# Patient Record
Sex: MALE | Race: WHITE | Employment: OTHER | URBAN - METROPOLITAN AREA
[De-identification: names, ages, dates, MRNs, and addresses within clinical notes are randomized per-mention and may not be internally consistent; named-entity substitution may affect disease eponyms.]

---

## 2017-02-21 ENCOUNTER — APPOINTMENT (OUTPATIENT)
Dept: CT IMAGING | Facility: HOSPITAL | Age: 52
End: 2017-02-21
Attending: EMERGENCY MEDICINE
Payer: COMMERCIAL

## 2017-02-21 ENCOUNTER — APPOINTMENT (OUTPATIENT)
Dept: GENERAL RADIOLOGY | Facility: HOSPITAL | Age: 52
End: 2017-02-21
Payer: COMMERCIAL

## 2017-02-21 ENCOUNTER — HOSPITAL ENCOUNTER (OUTPATIENT)
Facility: HOSPITAL | Age: 52
Setting detail: OBSERVATION
Discharge: HOME | End: 2017-02-22
Admitting: INTERNAL MEDICINE
Payer: COMMERCIAL

## 2017-02-21 DIAGNOSIS — R07.9 ACUTE CHEST PAIN: Primary | ICD-10-CM

## 2017-02-21 PROBLEM — R73.9 HYPERGLYCEMIA: Status: ACTIVE | Noted: 2017-02-21

## 2017-02-21 LAB
ALBUMIN SERPL BCP-MCNC: 4.5 G/DL (ref 3.5–4.8)
ALP SERPL-CCNC: 34 U/L (ref 32–100)
ALT SERPL-CCNC: 19 U/L (ref 17–63)
ANION GAP SERPL CALC-SCNC: 10 MMOL/L (ref 0–18)
AST SERPL-CCNC: 24 U/L (ref 15–41)
BASOPHILS # BLD: 0 K/UL (ref 0–0.2)
BASOPHILS NFR BLD: 0 %
BILIRUB DIRECT SERPL-MCNC: 0.2 MG/DL (ref 0–0.2)
BILIRUB SERPL-MCNC: 1.2 MG/DL (ref 0.3–1.2)
BUN SERPL-MCNC: 13 MG/DL (ref 8–20)
BUN/CREAT SERPL: 12.7 (ref 10–20)
CALCIUM SERPL-MCNC: 9.9 MG/DL (ref 8.5–10.5)
CHLORIDE SERPL-SCNC: 104 MMOL/L (ref 95–110)
CO2 SERPL-SCNC: 28 MMOL/L (ref 22–32)
CREAT SERPL-MCNC: 1.02 MG/DL (ref 0.5–1.5)
EOSINOPHIL # BLD: 0 K/UL (ref 0–0.7)
EOSINOPHIL NFR BLD: 1 %
ERYTHROCYTE [DISTWIDTH] IN BLOOD BY AUTOMATED COUNT: 13.2 % (ref 11–15)
GLUCOSE SERPL-MCNC: 120 MG/DL (ref 70–99)
HCT VFR BLD AUTO: 46.6 % (ref 41–52)
HGB BLD-MCNC: 15.9 G/DL (ref 13.5–17.5)
LIPASE SERPL-CCNC: 32 U/L (ref 22–51)
LYMPHOCYTES # BLD: 1.2 K/UL (ref 1–4)
LYMPHOCYTES NFR BLD: 24 %
MCH RBC QN AUTO: 30.8 PG (ref 27–32)
MCHC RBC AUTO-ENTMCNC: 34.1 G/DL (ref 32–37)
MCV RBC AUTO: 90.2 FL (ref 80–100)
MONOCYTES # BLD: 0.3 K/UL (ref 0–1)
MONOCYTES NFR BLD: 6 %
NEUTROPHILS # BLD AUTO: 3.4 K/UL (ref 1.8–7.7)
NEUTROPHILS NFR BLD: 69 %
OSMOLALITY UR CALC.SUM OF ELEC: 295 MOSM/KG (ref 275–295)
PLATELET # BLD AUTO: 189 K/UL (ref 140–400)
PMV BLD AUTO: 7.8 FL (ref 7.4–10.3)
POTASSIUM SERPL-SCNC: 4 MMOL/L (ref 3.3–5.1)
PROT SERPL-MCNC: 7.5 G/DL (ref 5.9–8.4)
RBC # BLD AUTO: 5.17 M/UL (ref 4.5–5.9)
SODIUM SERPL-SCNC: 142 MMOL/L (ref 136–144)
TROPONIN I SERPL-MCNC: 0.01 NG/ML (ref ?–0.03)
TROPONIN I SERPL-MCNC: 0.01 NG/ML (ref ?–0.03)
WBC # BLD AUTO: 4.9 K/UL (ref 4–11)

## 2017-02-21 PROCEDURE — 71275 CT ANGIOGRAPHY CHEST: CPT

## 2017-02-21 PROCEDURE — 74175 CTA ABDOMEN W/CONTRAST: CPT

## 2017-02-21 PROCEDURE — 84484 ASSAY OF TROPONIN QUANT: CPT | Performed by: EMERGENCY MEDICINE

## 2017-02-21 PROCEDURE — 93010 ELECTROCARDIOGRAM REPORT: CPT

## 2017-02-21 PROCEDURE — 80048 BASIC METABOLIC PNL TOTAL CA: CPT

## 2017-02-21 PROCEDURE — 85025 COMPLETE CBC W/AUTO DIFF WBC: CPT

## 2017-02-21 PROCEDURE — 99285 EMERGENCY DEPT VISIT HI MDM: CPT

## 2017-02-21 PROCEDURE — 36415 COLL VENOUS BLD VENIPUNCTURE: CPT

## 2017-02-21 PROCEDURE — 84484 ASSAY OF TROPONIN QUANT: CPT

## 2017-02-21 PROCEDURE — 93005 ELECTROCARDIOGRAM TRACING: CPT

## 2017-02-21 PROCEDURE — 71010 XR CHEST AP PORTABLE  (CPT=71010): CPT

## 2017-02-21 PROCEDURE — 80076 HEPATIC FUNCTION PANEL: CPT | Performed by: EMERGENCY MEDICINE

## 2017-02-21 PROCEDURE — 93010 ELECTROCARDIOGRAM REPORT: CPT | Performed by: EMERGENCY MEDICINE

## 2017-02-21 PROCEDURE — 83690 ASSAY OF LIPASE: CPT | Performed by: EMERGENCY MEDICINE

## 2017-02-21 RX ORDER — MAGNESIUM HYDROXIDE/ALUMINUM HYDROXICE/SIMETHICONE 120; 1200; 1200 MG/30ML; MG/30ML; MG/30ML
30 SUSPENSION ORAL ONCE
Status: COMPLETED | OUTPATIENT
Start: 2017-02-21 | End: 2017-02-21

## 2017-02-21 RX ORDER — ASPIRIN 81 MG/1
324 TABLET, CHEWABLE ORAL ONCE
Status: COMPLETED | OUTPATIENT
Start: 2017-02-21 | End: 2017-02-21

## 2017-02-21 NOTE — ED INITIAL ASSESSMENT (HPI)
Pt presents to the er with c/o chest pain and dizziness for 2 days.  Pt reports mid sternal chest pain that radiates up to his throat with sob  Pt also reports pain from back of his head to his shoulders  Pt speaking in complete sentences, equal rise and fa

## 2017-02-22 ENCOUNTER — APPOINTMENT (OUTPATIENT)
Dept: NUCLEAR MEDICINE | Facility: HOSPITAL | Age: 52
End: 2017-02-22
Attending: INTERNAL MEDICINE
Payer: COMMERCIAL

## 2017-02-22 ENCOUNTER — PRIOR ORIGINAL RECORDS (OUTPATIENT)
Dept: OTHER | Age: 52
End: 2017-02-22

## 2017-02-22 ENCOUNTER — APPOINTMENT (OUTPATIENT)
Dept: CV DIAGNOSTICS | Facility: HOSPITAL | Age: 52
End: 2017-02-22
Attending: INTERNAL MEDICINE
Payer: COMMERCIAL

## 2017-02-22 ENCOUNTER — APPOINTMENT (OUTPATIENT)
Dept: INTERVENTIONAL RADIOLOGY/VASCULAR | Facility: HOSPITAL | Age: 52
End: 2017-02-22
Attending: NURSE PRACTITIONER
Payer: COMMERCIAL

## 2017-02-22 VITALS
HEIGHT: 72 IN | SYSTOLIC BLOOD PRESSURE: 116 MMHG | OXYGEN SATURATION: 99 % | WEIGHT: 200 LBS | TEMPERATURE: 98 F | DIASTOLIC BLOOD PRESSURE: 75 MMHG | HEART RATE: 66 BPM | RESPIRATION RATE: 16 BRPM | BODY MASS INDEX: 27.09 KG/M2

## 2017-02-22 LAB
ALBUMIN SERPL BCP-MCNC: 4.2 G/DL (ref 3.5–4.8)
ALBUMIN/GLOB SERPL: 1.6 {RATIO} (ref 1–2)
ALP SERPL-CCNC: 31 U/L (ref 32–100)
ALT SERPL-CCNC: 17 U/L (ref 17–63)
ANION GAP SERPL CALC-SCNC: 10 MMOL/L (ref 0–18)
APTT PPP: 28.1 SECONDS (ref 23.2–35.3)
AST SERPL-CCNC: 21 U/L (ref 15–41)
BILIRUB SERPL-MCNC: 1.1 MG/DL (ref 0.3–1.2)
BUN SERPL-MCNC: 11 MG/DL (ref 8–20)
BUN/CREAT SERPL: 11.8 (ref 10–20)
CALCIUM SERPL-MCNC: 9.4 MG/DL (ref 8.5–10.5)
CHLORIDE SERPL-SCNC: 104 MMOL/L (ref 95–110)
CHOLEST SERPL-MCNC: 167 MG/DL (ref 110–200)
CO2 SERPL-SCNC: 26 MMOL/L (ref 22–32)
CREAT SERPL-MCNC: 0.93 MG/DL (ref 0.5–1.5)
GLOBULIN PLAS-MCNC: 2.6 G/DL (ref 2.5–3.7)
GLUCOSE SERPL-MCNC: 97 MG/DL (ref 70–99)
HDLC SERPL-MCNC: 44 MG/DL
INR BLD: 1 (ref 0.9–1.2)
LDLC SERPL CALC-MCNC: 101 MG/DL (ref 0–99)
NONHDLC SERPL-MCNC: 123 MG/DL
OSMOLALITY UR CALC.SUM OF ELEC: 289 MOSM/KG (ref 275–295)
POTASSIUM SERPL-SCNC: 3.7 MMOL/L (ref 3.3–5.1)
PROT SERPL-MCNC: 6.8 G/DL (ref 5.9–8.4)
PROTHROMBIN TIME: 13.1 SECONDS (ref 11.8–14.5)
SODIUM SERPL-SCNC: 140 MMOL/L (ref 136–144)
TRIGL SERPL-MCNC: 110 MG/DL (ref 1–149)

## 2017-02-22 PROCEDURE — 93018 CV STRESS TEST I&R ONLY: CPT | Performed by: INTERNAL MEDICINE

## 2017-02-22 PROCEDURE — 78452 HT MUSCLE IMAGE SPECT MULT: CPT

## 2017-02-22 PROCEDURE — B2111ZZ FLUOROSCOPY OF MULTIPLE CORONARY ARTERIES USING LOW OSMOLAR CONTRAST: ICD-10-PCS | Performed by: INTERNAL MEDICINE

## 2017-02-22 PROCEDURE — 93458 L HRT ARTERY/VENTRICLE ANGIO: CPT

## 2017-02-22 PROCEDURE — 93017 CV STRESS TEST TRACING ONLY: CPT

## 2017-02-22 PROCEDURE — 93018 CV STRESS TEST I&R ONLY: CPT | Performed by: NUCLEAR MEDICINE

## 2017-02-22 PROCEDURE — 80061 LIPID PANEL: CPT | Performed by: INTERNAL MEDICINE

## 2017-02-22 PROCEDURE — 85610 PROTHROMBIN TIME: CPT | Performed by: NURSE PRACTITIONER

## 2017-02-22 PROCEDURE — 93016 CV STRESS TEST SUPVJ ONLY: CPT | Performed by: INTERNAL MEDICINE

## 2017-02-22 PROCEDURE — 4A023N7 MEASUREMENT OF CARDIAC SAMPLING AND PRESSURE, LEFT HEART, PERCUTANEOUS APPROACH: ICD-10-PCS | Performed by: INTERNAL MEDICINE

## 2017-02-22 PROCEDURE — 85730 THROMBOPLASTIN TIME PARTIAL: CPT | Performed by: NURSE PRACTITIONER

## 2017-02-22 PROCEDURE — 80053 COMPREHEN METABOLIC PANEL: CPT | Performed by: INTERNAL MEDICINE

## 2017-02-22 PROCEDURE — B2151ZZ FLUOROSCOPY OF LEFT HEART USING LOW OSMOLAR CONTRAST: ICD-10-PCS | Performed by: INTERNAL MEDICINE

## 2017-02-22 RX ORDER — HEPARIN SODIUM 1000 [USP'U]/ML
INJECTION, SOLUTION INTRAVENOUS; SUBCUTANEOUS
Status: COMPLETED
Start: 2017-02-22 | End: 2017-02-22

## 2017-02-22 RX ORDER — HEPARIN SODIUM 1000 [USP'U]/ML
4600 INJECTION, SOLUTION INTRAVENOUS; SUBCUTANEOUS ONCE
Status: COMPLETED | OUTPATIENT
Start: 2017-02-22 | End: 2017-02-22

## 2017-02-22 RX ORDER — METOPROLOL SUCCINATE 25 MG/1
12.5 TABLET, EXTENDED RELEASE ORAL
Qty: 1 TABLET | Refills: 0 | Status: SHIPPED | OUTPATIENT
Start: 2017-02-22

## 2017-02-22 RX ORDER — SODIUM CHLORIDE 9 MG/ML
INJECTION, SOLUTION INTRAVENOUS CONTINUOUS
Status: DISCONTINUED | OUTPATIENT
Start: 2017-02-22 | End: 2017-02-22

## 2017-02-22 RX ORDER — LISINOPRIL 10 MG/1
10 TABLET ORAL DAILY
Status: DISCONTINUED | OUTPATIENT
Start: 2017-02-22 | End: 2017-02-22

## 2017-02-22 RX ORDER — METOPROLOL SUCCINATE 25 MG/1
12.5 TABLET, EXTENDED RELEASE ORAL
Status: DISCONTINUED | OUTPATIENT
Start: 2017-02-22 | End: 2017-02-22

## 2017-02-22 RX ORDER — VERAPAMIL HYDROCHLORIDE 2.5 MG/ML
INJECTION, SOLUTION INTRAVENOUS
Status: COMPLETED
Start: 2017-02-22 | End: 2017-02-22

## 2017-02-22 RX ORDER — ASPIRIN 81 MG/1
81 TABLET ORAL DAILY
Qty: 1 TABLET | Refills: 0 | Status: SHIPPED | OUTPATIENT
Start: 2017-02-22

## 2017-02-22 RX ORDER — SODIUM CHLORIDE 9 MG/ML
INJECTION, SOLUTION INTRAVENOUS
Status: DISCONTINUED
Start: 2017-02-22 | End: 2017-02-22

## 2017-02-22 RX ORDER — LISINOPRIL 10 MG/1
10 TABLET ORAL DAILY
Qty: 1 TABLET | Refills: 0 | Status: SHIPPED | OUTPATIENT
Start: 2017-02-22 | End: 2017-02-22

## 2017-02-22 RX ORDER — MIDAZOLAM HYDROCHLORIDE 1 MG/ML
INJECTION INTRAMUSCULAR; INTRAVENOUS
Status: COMPLETED
Start: 2017-02-22 | End: 2017-02-22

## 2017-02-22 RX ORDER — CHLORHEXIDINE GLUCONATE 4 G/100ML
30 SOLUTION TOPICAL
Status: DISCONTINUED | OUTPATIENT
Start: 2017-02-23 | End: 2017-02-22

## 2017-02-22 RX ORDER — NITROGLYCERIN 20 MG/100ML
INJECTION INTRAVENOUS
Status: COMPLETED
Start: 2017-02-22 | End: 2017-02-22

## 2017-02-22 RX ORDER — SODIUM CHLORIDE 9 MG/ML
INJECTION, SOLUTION INTRAVENOUS
Status: COMPLETED
Start: 2017-02-22 | End: 2017-02-22

## 2017-02-22 RX ORDER — ASPIRIN 81 MG/1
81 TABLET ORAL DAILY
Status: DISCONTINUED | OUTPATIENT
Start: 2017-02-22 | End: 2017-02-22

## 2017-02-22 RX ORDER — MIDAZOLAM HYDROCHLORIDE 1 MG/ML
2 INJECTION INTRAMUSCULAR; INTRAVENOUS EVERY 5 MIN PRN
Status: DISCONTINUED | OUTPATIENT
Start: 2017-02-22 | End: 2017-02-22

## 2017-02-22 RX ORDER — POTASSIUM CHLORIDE 20 MEQ/1
40 TABLET, EXTENDED RELEASE ORAL ONCE
Status: COMPLETED | OUTPATIENT
Start: 2017-02-22 | End: 2017-02-22

## 2017-02-22 NOTE — IMAGING NOTE
Refer to stress test for full details. Pt had up to 2.0 mm ST depression during stress test ,but denied any c/o chest pain. Reba Sierra RN for pt was notified. Will text Dr Severa Res ( go to) also of EKG changes.  Wet read form was given to nuc tech to bring to

## 2017-02-22 NOTE — H&P
[facility]    PATIENT'S NAME: Brittni Valladares PHYSICIAN: Fercho Contreras MD   PATIENT ACCOUNT#:   [id number]    LOCATION:  [address] [place] [address],[address] RECORD #:   [id number]       YOB: 1965  ADMISSION DATE:       02/21/2017 peripheral pulses. No edema. NEUROLOGIC:  Nonfocal.      LABORATORY DATA:  Sodium 142, glucose 120, potassium 4.0, chloride 104, carbon dioxide 28, BUN 13, creatinine 1.02, calcium 9.9. GFR more than 60.   Alkaline phosphatase 85; AST 34; ALT 24; biliru

## 2017-02-22 NOTE — PROGRESS NOTES
[facility]    Progress Note    Lincoln Morgan Patient Status:  Observation    10/27/1965 MRN [id number]   Location [facility]5W Attending Marija Shah MD   Hosp Day # 1 PCP Fernando Neville MD         Assessment and Plan:     Acut BILT 1.1 02/22/2017   TP 6.8 02/22/2017   AST 21 02/22/2017   ALT 17 02/22/2017   LIP 32 02/21/2017   TROP 0.01 02/21/2017       Xr Chest Ap Portable  (cpt=71010)    2/21/2017  CONCLUSION:  1. Stable findings from May 11, 2015. 2. Normal heart and lungs.

## 2017-02-22 NOTE — PROGRESS NOTES
Post cath report called to [facility] Wrist band applied to right radial access site 8cc air residual noted in band @ 15:10.    Transferred to cath lab holding room hand off to [facility].

## 2017-02-22 NOTE — CONSULTS
[facility]  Cardiology Consultation    Angel Ramsay Patient Status:  Observation    10/27/1965 MRN [id number]   Location [facility]5W Attending Noah Rocha MD   Hosp Day # 0 PCP Latesha Raya MD     Reason for Consultation:  Chest pain normal. No murmur, pericardial rub, S3.  Lungs: Clear without wheezes, rales, rhonchi or dullness. Normal excursions and effort. Abdomen: Soft, non-tender. Extremities: Without clubbing, cyanosis or edema. Peripheral pulses are 2+.   Neurologic: Alert a MD  2/21/2017  10:48 PM

## 2017-02-22 NOTE — PROCEDURES
Pre-op: Chest pain, abnormal stress test  Post-op: Same  Procedure: L heart cath; LV and coronary angio; R radial approach  Findings: LV normal, EF = 60%; LVEDP = 12                Coronaries normal                Severe mid-LAD bridging  EBL: < 5 cc  Comp

## 2017-02-22 NOTE — PRE-SEDATION ASSESSMENT
Pre-Procedure Sedation Assessment    Chief Complaint/Indication for procedure: Chest pain, abnormal GXT  History of snoring or sleep or apnea?    No    History of previous problems with anesthesia or sedation  No    Physical Findings:  Neck: nl ROM  CV: RRR

## 2017-03-01 ENCOUNTER — PRIOR ORIGINAL RECORDS (OUTPATIENT)
Dept: OTHER | Age: 52
End: 2017-03-01

## 2017-03-01 ENCOUNTER — MYAURORA ACCOUNT LINK (OUTPATIENT)
Dept: OTHER | Age: 52
End: 2017-03-01

## 2017-03-02 LAB
ALBUMIN: 4.2 G/DL
ALKALINE PHOSPHATATE(ALK PHOS): 31 IU/L
ALT (SGPT): 17 U/L
AST (SGOT): 21 U/L
BILIRUBIN TOTAL: 1.1 MG/DL
BUN: 11 MG/DL
CALCIUM: 9.4 MG/DL
CHLORIDE: 104 MEQ/L
CHOLESTEROL, TOTAL: 167 MG/DL
CREATININE, SERUM: 0.93 MG/DL
GLOBULIN: 2.6 G/DL
GLUCOSE: 97 MG/DL
GLUCOSE: 97 MG/DL
HDL CHOLESTEROL: 44 MG/DL
LDL CHOLESTEROL: 101 MG/DL
NON-HDL CHOLESTEROL: 123 MG/DL
POTASSIUM, SERUM: 3.7 MEQ/L
PROTEIN, TOTAL: 6.8 G/DL
SGOT (AST): 21 IU/L
SGPT (ALT): 17 IU/L
SODIUM: 140 MEQ/L
TRIGLYCERIDES: 110 MG/DL

## 2019-03-01 VITALS
SYSTOLIC BLOOD PRESSURE: 116 MMHG | WEIGHT: 199 LBS | OXYGEN SATURATION: 99 % | BODY MASS INDEX: 27.86 KG/M2 | DIASTOLIC BLOOD PRESSURE: 80 MMHG | HEIGHT: 71 IN | HEART RATE: 58 BPM

## 2019-12-24 ENCOUNTER — HOSPITAL ENCOUNTER (EMERGENCY)
Facility: HOSPITAL | Age: 54
Discharge: HOME | End: 2019-12-24
Attending: EMERGENCY MEDICINE
Payer: COMMERCIAL

## 2019-12-24 VITALS
WEIGHT: 199.94 LBS | HEART RATE: 74 BPM | OXYGEN SATURATION: 99 % | SYSTOLIC BLOOD PRESSURE: 127 MMHG | DIASTOLIC BLOOD PRESSURE: 87 MMHG | TEMPERATURE: 98 F | BODY MASS INDEX: 27 KG/M2 | RESPIRATION RATE: 20 BRPM

## 2019-12-24 DIAGNOSIS — R13.10 DYSPHAGIA, UNSPECIFIED TYPE: Primary | ICD-10-CM

## 2019-12-24 PROCEDURE — 80048 BASIC METABOLIC PNL TOTAL CA: CPT | Performed by: EMERGENCY MEDICINE

## 2019-12-24 PROCEDURE — 99284 EMERGENCY DEPT VISIT MOD MDM: CPT

## 2019-12-24 PROCEDURE — 93005 ELECTROCARDIOGRAM TRACING: CPT

## 2019-12-24 PROCEDURE — 80048 BASIC METABOLIC PNL TOTAL CA: CPT

## 2019-12-24 PROCEDURE — 84484 ASSAY OF TROPONIN QUANT: CPT | Performed by: EMERGENCY MEDICINE

## 2019-12-24 PROCEDURE — 93010 ELECTROCARDIOGRAM REPORT: CPT | Performed by: EMERGENCY MEDICINE

## 2019-12-24 PROCEDURE — 85025 COMPLETE CBC W/AUTO DIFF WBC: CPT

## 2019-12-24 PROCEDURE — 85025 COMPLETE CBC W/AUTO DIFF WBC: CPT | Performed by: EMERGENCY MEDICINE

## 2019-12-24 PROCEDURE — 36415 COLL VENOUS BLD VENIPUNCTURE: CPT

## 2019-12-24 RX ORDER — SUCRALFATE ORAL 1 G/10ML
1 SUSPENSION ORAL
Qty: 1 BOTTLE | Refills: 0 | Status: SHIPPED | OUTPATIENT
Start: 2019-12-24

## 2019-12-24 NOTE — ED PROVIDER NOTES
Patient Seen in: [facility] Emergency Department      History   Patient presents with:  Difficulty Swallowing    Stated Complaint: difficulty swallowing and objects getting stuck, no resp issues, hx of gerd    HPI    67-year-old male with history Pupils are equal, round, and reactive to light. Neck:      Musculoskeletal: Normal range of motion. Cardiovascular:      Rate and Rhythm: Normal rate and regular rhythm. Heart sounds: Normal heart sounds.    Pulmonary:      Effort: Pulmonary effort for follow-up. ER return precautions given and patient agreeable to the plan.           Disposition and Plan     Clinical Impression:  Dysphagia, unspecified type  (primary encounter diagnosis)    Disposition:  Discharge  12/24/2019  4:15 pm    Follow-up:

## 2019-12-24 NOTE — ED INITIAL ASSESSMENT (HPI)
Patient c/o difficulty swallowing x 1 week. Feels food is going down slower and getting stuck at times. Patient states he has had this before. Hx of GERD. Sent by IC for further workup.

## 2024-06-15 ENCOUNTER — HOSPITAL ENCOUNTER (OUTPATIENT)
Dept: GENERAL RADIOLOGY | Age: 59
Discharge: HOME | End: 2024-06-15
Attending: INTERNAL MEDICINE

## 2024-06-15 DIAGNOSIS — G89.29 CHRONIC PAIN: ICD-10-CM

## 2024-06-15 DIAGNOSIS — M25.561 BILATERAL KNEE PAIN: ICD-10-CM

## 2024-06-15 DIAGNOSIS — M25.562 BILATERAL KNEE PAIN: ICD-10-CM

## 2024-06-15 PROCEDURE — 73560 X-RAY EXAM OF KNEE 1 OR 2: CPT | Performed by: INTERNAL MEDICINE

## 2024-07-02 ENCOUNTER — HOSPITAL ENCOUNTER (OUTPATIENT)
Dept: CT IMAGING | Age: 59
Discharge: HOME | End: 2024-07-02
Attending: INTERNAL MEDICINE

## 2024-07-02 DIAGNOSIS — Z13.6 SCREENING FOR CARDIOVASCULAR CONDITION: ICD-10-CM

## (undated) NOTE — LETTER
[address], [place]  Authorization for Invasive Procedures  1.  I hereby authorize Dr. Natale Cockayne, my physician and whomever may be designated as the doctor's assistant, to perform the following operation and/or procedure:  Cardiac ca the event that I wish to have autologous transfusions of my own blood, or a directed donor transfusion, I will discuss this with my physician.      5. I consent to the photographing of the operations or procedures to be performed for the purposes of advanci Responsible person in case of minor or unconscious: _____________________________Relationship: ____________     Witness Signature: ____________________________________________ Date: __________ Time: ___________    Statement of Physician  My signature below

## (undated) NOTE — IP AVS SNAPSHOT
[address]  [address] [facility] [phone]                Discharge Summary   2/21/2017    Sonal Fox           Admission Information        Provider Department    2/21/2017 Lidia Olivares MD [facility] 5w CATH SITE INSTRUCTIONS GIVEN TO PATIENT   FOLLOW UP WRIST APPT MADE FOR YOU     Follow-up Information     Follow up with Kena Lemus MD In 1 week. Specialty:  Internal Medicine    Contact information:    [address] .  [address] [address] Medications Returned:           Additional Information       We are concerned for your overall well being:    - If you are a smoker or have smoked in the last 12 months, we encourage you to explore options for quitting.     - If you have concerns related t review your medications with you before you are discharged, and can provide you with additional printed information. Not all patients will experience these side effects or respond to medications the same.  Please call your provider or healthcare team if you

## (undated) NOTE — ED AVS SNAPSHOT
Aga Haddad   MRN: [id number]    Department:  [facility] Emergency Department   Date of Visit:  12/24/2019           Disclosure     Insurance plans vary and the physician(s) referred by the ER may not be covered by your plan.  Please contact you CARE PHYSICIAN AT ONCE OR RETURN IMMEDIATELY TO THE EMERGENCY DEPARTMENT. If you have been prescribed any medication(s), please fill your prescription right away and begin taking the medication(s) as directed.   If you believe that any of the medications